# Patient Record
Sex: MALE | Race: BLACK OR AFRICAN AMERICAN | NOT HISPANIC OR LATINO
[De-identification: names, ages, dates, MRNs, and addresses within clinical notes are randomized per-mention and may not be internally consistent; named-entity substitution may affect disease eponyms.]

---

## 2017-10-20 PROBLEM — Z00.00 ENCOUNTER FOR PREVENTIVE HEALTH EXAMINATION: Status: ACTIVE | Noted: 2017-10-20

## 2017-10-22 ENCOUNTER — FORM ENCOUNTER (OUTPATIENT)
Age: 42
End: 2017-10-22

## 2017-10-23 ENCOUNTER — APPOINTMENT (OUTPATIENT)
Dept: ORTHOPEDIC SURGERY | Facility: CLINIC | Age: 42
End: 2017-10-23
Payer: COMMERCIAL

## 2017-10-23 ENCOUNTER — OUTPATIENT (OUTPATIENT)
Dept: OUTPATIENT SERVICES | Facility: HOSPITAL | Age: 42
LOS: 1 days | End: 2017-10-23
Payer: COMMERCIAL

## 2017-10-23 ENCOUNTER — APPOINTMENT (OUTPATIENT)
Dept: RADIOLOGY | Facility: CLINIC | Age: 42
End: 2017-10-23
Payer: COMMERCIAL

## 2017-10-23 VITALS — BODY MASS INDEX: 22.73 KG/M2 | HEIGHT: 68 IN | WEIGHT: 150 LBS

## 2017-10-23 DIAGNOSIS — Z83.3 FAMILY HISTORY OF DIABETES MELLITUS: ICD-10-CM

## 2017-10-23 DIAGNOSIS — Z87.891 PERSONAL HISTORY OF NICOTINE DEPENDENCE: ICD-10-CM

## 2017-10-23 DIAGNOSIS — Z82.49 FAMILY HISTORY OF ISCHEMIC HEART DISEASE AND OTHER DISEASES OF THE CIRCULATORY SYSTEM: ICD-10-CM

## 2017-10-23 DIAGNOSIS — Z78.9 OTHER SPECIFIED HEALTH STATUS: ICD-10-CM

## 2017-10-23 PROCEDURE — 73521 X-RAY EXAM HIPS BI 2 VIEWS: CPT | Mod: 26

## 2017-10-23 PROCEDURE — 99204 OFFICE O/P NEW MOD 45 MIN: CPT

## 2017-10-23 PROCEDURE — 73521 X-RAY EXAM HIPS BI 2 VIEWS: CPT

## 2017-11-15 ENCOUNTER — FORM ENCOUNTER (OUTPATIENT)
Age: 42
End: 2017-11-15

## 2017-11-16 ENCOUNTER — OUTPATIENT (OUTPATIENT)
Dept: OUTPATIENT SERVICES | Facility: HOSPITAL | Age: 42
LOS: 1 days | End: 2017-11-16

## 2017-11-16 ENCOUNTER — APPOINTMENT (OUTPATIENT)
Dept: ORTHOPEDIC SURGERY | Facility: CLINIC | Age: 42
End: 2017-11-16
Payer: COMMERCIAL

## 2017-11-16 ENCOUNTER — APPOINTMENT (OUTPATIENT)
Dept: MRI IMAGING | Facility: CLINIC | Age: 42
End: 2017-11-16
Payer: COMMERCIAL

## 2017-11-16 ENCOUNTER — APPOINTMENT (OUTPATIENT)
Dept: ULTRASOUND IMAGING | Facility: CLINIC | Age: 42
End: 2017-11-16
Payer: COMMERCIAL

## 2017-11-16 PROCEDURE — 27093 INJECTION FOR HIP X-RAY: CPT | Mod: LT

## 2017-11-16 PROCEDURE — 20611 DRAIN/INJ JOINT/BURSA W/US: CPT | Mod: LT,59

## 2017-11-16 PROCEDURE — 73722 MRI JOINT OF LWR EXTR W/DYE: CPT | Mod: 26,LT

## 2017-11-16 PROCEDURE — 99213 OFFICE O/P EST LOW 20 MIN: CPT | Mod: 25

## 2019-09-26 ENCOUNTER — APPOINTMENT (OUTPATIENT)
Dept: ORTHOPEDIC SURGERY | Facility: CLINIC | Age: 44
End: 2019-09-26
Payer: COMMERCIAL

## 2019-09-26 VITALS — WEIGHT: 150 LBS | HEIGHT: 68 IN | BODY MASS INDEX: 22.73 KG/M2

## 2019-09-26 DIAGNOSIS — M25.552 PAIN IN LEFT HIP: ICD-10-CM

## 2019-09-26 PROCEDURE — 99215 OFFICE O/P EST HI 40 MIN: CPT

## 2019-09-27 PROBLEM — M25.552 LEFT HIP PAIN: Noted: 2017-10-23

## 2019-10-06 ENCOUNTER — FORM ENCOUNTER (OUTPATIENT)
Age: 44
End: 2019-10-06

## 2019-10-07 ENCOUNTER — TRANSCRIPTION ENCOUNTER (OUTPATIENT)
Age: 44
End: 2019-10-07

## 2019-10-07 ENCOUNTER — APPOINTMENT (OUTPATIENT)
Dept: MRI IMAGING | Facility: CLINIC | Age: 44
End: 2019-10-07
Payer: COMMERCIAL

## 2019-10-07 ENCOUNTER — OUTPATIENT (OUTPATIENT)
Dept: OUTPATIENT SERVICES | Facility: HOSPITAL | Age: 44
LOS: 1 days | End: 2019-10-07

## 2019-10-07 PROCEDURE — 72148 MRI LUMBAR SPINE W/O DYE: CPT | Mod: 26

## 2019-10-10 ENCOUNTER — APPOINTMENT (OUTPATIENT)
Dept: ORTHOPEDIC SURGERY | Facility: CLINIC | Age: 44
End: 2019-10-10
Payer: COMMERCIAL

## 2019-10-10 ENCOUNTER — APPOINTMENT (OUTPATIENT)
Dept: PHYSICAL MEDICINE AND REHAB | Facility: CLINIC | Age: 44
End: 2019-10-10
Payer: COMMERCIAL

## 2019-10-10 VITALS
BODY MASS INDEX: 22.73 KG/M2 | RESPIRATION RATE: 16 BRPM | HEIGHT: 68 IN | OXYGEN SATURATION: 99 % | WEIGHT: 150 LBS | HEART RATE: 73 BPM

## 2019-10-10 PROCEDURE — 99244 OFF/OP CNSLTJ NEW/EST MOD 40: CPT

## 2019-10-10 PROCEDURE — 99214 OFFICE O/P EST MOD 30 MIN: CPT

## 2019-10-29 ENCOUNTER — APPOINTMENT (OUTPATIENT)
Dept: PHYSICAL MEDICINE AND REHAB | Facility: CLINIC | Age: 44
End: 2019-10-29
Payer: COMMERCIAL

## 2019-10-29 VITALS
WEIGHT: 150 LBS | HEART RATE: 68 BPM | DIASTOLIC BLOOD PRESSURE: 85 MMHG | BODY MASS INDEX: 22.73 KG/M2 | SYSTOLIC BLOOD PRESSURE: 139 MMHG | HEIGHT: 68 IN

## 2019-10-29 PROCEDURE — 95911 NRV CNDJ TEST 9-10 STUDIES: CPT

## 2019-10-29 PROCEDURE — 95886 MUSC TEST DONE W/N TEST COMP: CPT | Mod: 59

## 2019-10-31 ENCOUNTER — APPOINTMENT (OUTPATIENT)
Dept: PHYSICAL MEDICINE AND REHAB | Facility: CLINIC | Age: 44
End: 2019-10-31
Payer: COMMERCIAL

## 2019-10-31 VITALS
WEIGHT: 150 LBS | RESPIRATION RATE: 16 BRPM | OXYGEN SATURATION: 95 % | HEART RATE: 91 BPM | HEIGHT: 68 IN | BODY MASS INDEX: 22.73 KG/M2

## 2019-10-31 PROCEDURE — 99214 OFFICE O/P EST MOD 30 MIN: CPT

## 2019-11-15 ENCOUNTER — APPOINTMENT (OUTPATIENT)
Dept: MRI IMAGING | Facility: CLINIC | Age: 44
End: 2019-11-15
Payer: COMMERCIAL

## 2019-11-15 ENCOUNTER — OUTPATIENT (OUTPATIENT)
Dept: OUTPATIENT SERVICES | Facility: HOSPITAL | Age: 44
LOS: 1 days | End: 2019-11-15

## 2019-11-15 PROCEDURE — 70553 MRI BRAIN STEM W/O & W/DYE: CPT | Mod: 26

## 2019-12-05 ENCOUNTER — APPOINTMENT (OUTPATIENT)
Dept: NEUROLOGY | Facility: CLINIC | Age: 44
End: 2019-12-05
Payer: COMMERCIAL

## 2019-12-05 ENCOUNTER — APPOINTMENT (OUTPATIENT)
Dept: ORTHOPEDIC SURGERY | Facility: CLINIC | Age: 44
End: 2019-12-05
Payer: COMMERCIAL

## 2019-12-05 VITALS
BODY MASS INDEX: 21.37 KG/M2 | WEIGHT: 141 LBS | OXYGEN SATURATION: 98 % | DIASTOLIC BLOOD PRESSURE: 88 MMHG | SYSTOLIC BLOOD PRESSURE: 134 MMHG | TEMPERATURE: 98.2 F | HEART RATE: 64 BPM | HEIGHT: 68 IN

## 2019-12-05 PROCEDURE — 99245 OFF/OP CONSLTJ NEW/EST HI 55: CPT

## 2019-12-05 PROCEDURE — 99213 OFFICE O/P EST LOW 20 MIN: CPT

## 2019-12-05 NOTE — PHYSICAL EXAM
[FreeTextEntry1] : Physical Exam\par Constitutional: no apparent distress\par Psychiatric: normal affect, euthyhmic, alert and oriented x 3\par HEENT: moist mucous membranes, oropharynx clear\par Neck: supple, no lymphadenopathy\par Respiratory: clear to auscultation bilaterally, no crackles or wheezing\par Cardiovascular: regular rate and rhythm, normal S1/S2, no murmurs, no edema\par GI: soft, non-tender, no distended, bowel sounds present; no hepatosplenomegaly on palpation\par Musculoskeletal: extremities warm, well-perfused, normal range of motion\par Skin: no rashes, bruises, or lesions\par \par Neurologic Exam:\par Mental Status: awake and alert, oriented to time, place, and person, MOCA 26/30 (lost points for abstraction and delayed recall)\par Cranial Nerves: I: deferred; II: pupils equal round and reactive, visual fields full to confrontation; III, IV, VI: extraocular movements full with no nystagmus; V: facial sensation intact and symmetric; VII: facial power symmetric; VIII: hearing intact to finger rub; IX/X: palate elevates symmetrically, no dysarthria; XI: shoulder shrug symmetric; XII: tongue protrudes midline\par Motor: normal bulk and tone, no orbiting or pronator drift, power 5/5 to confrontation throughout including shoulder abduction, elbow flexion and extension, wrist flexion and extension, hip flexion, knee flexion and extension, plantarflexion, and dorsiflexion, no abnormal movements\par Sensation: intact to light touch in distal upper and lower extremities bilaterally\par Coordination: finger-nose-finger intact bilaterally\par Reflexes: 2+ biceps, triceps, brachioradialis, patella\par Gait: narrow base, normal stance and stride, normal arm swing

## 2019-12-05 NOTE — DISCUSSION/SUMMARY
[FreeTextEntry1] : 44-year-old man with left thigh and face tingling, word-finding difficulty, and short-term memory impairment over the past 2 months in the setting of depression and anxiety.  \par \par I am unable to find a physiologic explanation for the tingling -- he has no deficits on exam and imaging and neurophysiologic studies have been normal.  Given these normal findings I am reassured that despite the lack of a definite diagnosis these symptoms are not dangerous.\par \par Differential diagnosis for the cognitive symptoms in a patient his age includes unrecognized seizures (and we will obtain routine and ambulatory EEG to exclude this possibility) or, more likely, somatic manifestations of his underlying mood symptoms.  He is open to this possibility and had begun to consider it own his own prior to this evaluation.  We discussed potential treatments for depression and anxiety including talk therapy and SSRI.  He is not interested in therapy but would like to start an SSRI.  \par \par # Cognitive complaints, likely 2/2 depression and anxiety\par -Start Lexapro 10 mg (potential side effects discussed including lethargy, weight gain, decreased sex drive, withdrawal upon discontinuation, patient aware that effects can take up to 6-8 weeks)\par -Not interested in talk therapy at this time\par -Routine/ambulatory EEG to rule out unrecognized seizures\par \par RTC 3 months

## 2019-12-05 NOTE — DATA REVIEWED
[de-identified] : EMG/NCS unremarkable [de-identified] : MRI brain unremarkable\par MRI lumbar spine L4/L5 mild foraminal narrowing\par

## 2019-12-05 NOTE — REVIEW OF SYSTEMS
[As Noted in HPI] : as noted in HPI [Negative] : Endocrine [FreeTextEntry1] : see intake sheet, reviewed with patient

## 2019-12-05 NOTE — HISTORY OF PRESENT ILLNESS
[FreeTextEntry1] : 44-year-old man with no significant past medical history referred by Dr. Duvall for evaluation of multiple neurologic symptoms over the past 2 months.\par \par He reports that about 8 weeks ago he developed numbness, tingling, and burning pain in his left thigh.  He initially attributed this to a known labral tear.  Was seen by Ortho and PMR and had an MRI of the lumbar spine (mild bilateral foraminal narrowing at L4/L5) and an EMG/NCV (normal).  About 2 weeks later he developed similar tingling in the left side of his face.  MRI brain w/wo contrast was normal.  Around the same time as the facial tingling, he also began to have word-finding difficulty and trouble with short term memory, along with what he describes as a loss of fine motor control.  \par \par He also notes that he has been more irritable and short-tempered lately.  He no longer enjoys spending time with friends as much as he used to and has trouble motivating himself to go out.  Concentration, energy, and appetite are reduced and he has frequent negative thoughts, but denies SI.  He says that he has been under a lot stress over the past year (losing his job, starting a new job, family member with major medical problems, wife losing her job).  He is starting to wonder if some of his physical and cognitive symptoms may be related to stress and anxiety.

## 2019-12-24 ENCOUNTER — OTHER (OUTPATIENT)
Age: 44
End: 2019-12-24

## 2019-12-26 ENCOUNTER — OTHER (OUTPATIENT)
Age: 44
End: 2019-12-26

## 2020-02-27 ENCOUNTER — APPOINTMENT (OUTPATIENT)
Dept: NEUROLOGY | Facility: CLINIC | Age: 45
End: 2020-02-27
Payer: COMMERCIAL

## 2020-02-27 VITALS
HEIGHT: 68 IN | SYSTOLIC BLOOD PRESSURE: 149 MMHG | TEMPERATURE: 98.6 F | OXYGEN SATURATION: 96 % | HEART RATE: 66 BPM | WEIGHT: 145 LBS | BODY MASS INDEX: 21.98 KG/M2 | DIASTOLIC BLOOD PRESSURE: 90 MMHG | RESPIRATION RATE: 16 BRPM

## 2020-02-27 PROCEDURE — 99214 OFFICE O/P EST MOD 30 MIN: CPT

## 2020-02-27 NOTE — HISTORY OF PRESENT ILLNESS
[FreeTextEntry1] : 44-year-old man who presents for follow up of anxiety and multiple neurologic symptoms.  \par \par At last visit he complained of numbness/tingling burning in the left thigh and left side of his face, as well as irritability, anhedonia, brain fog in the setting of work and family stressors.  Started Lexapro 10 mg daily for anxiety at that visit.\par \par He reports that he has felt much better since starting the Lexapro -- confusion, brain fog, irritability have resolved.  He has noticed some sexual side effects but says these are manageable.  Has also noticed a mild low amplitude high frequency tremor in the left > right hand.  He does report that he is still under stress at work and has been drinking more heavily (4-5 drinks a night) and smoking cigarettes and marijuana occasionally.  \par \par Has seen an ophthalmologist since last visit and was diagnosed with central serous retinopathy which he was told was due to stress.  On repeat visit this resolved after starting Lexapro.\par \par Left face symptoms have resolved.  Left thigh numbness/tightness/tingling has persisted but it bothers him less than it used to.\par \par

## 2020-02-27 NOTE — DATA REVIEWED
[de-identified] : MRI brain 11/15/2019 personally reviewed, normal\par MRI L spine 10/08/2019 personally reviewed, no major spinal stenosis or disk space narrowing\par  [de-identified] : EMG 10/29/2019 normal

## 2020-02-27 NOTE — PHYSICAL EXAM
[FreeTextEntry1] : Physical Exam\par Constitutional: no apparent distress\par Psychiatric: normal affect, euthyhmic, alert and oriented x 3\par \par Neurologic Exam:\par Mental Status: awake and alert, speech fluent and prosodic with no paraphasic errors\par Cranial Nerves: I: deferred; II: pupils equal round and reactive, visual fields full to confrontation III, IV, VI: extraocular movements full with no nystagmus; V: facial sensation intact and symmetric; VII: facial power symmetric; VIII: hearing intact to finger rub; IX/X: palate elevates symmetrically, no dysarthria; XI: shoulder shrug symmetric; XII: tongue protrudes midline\par Motor: normal bulk and tone, no orbiting or pronator drift, no abnormal movements\par Sensation: intact to light touch in distal upper and lower extremities bilaterally\par Coordination: finger-nose-finger intact bilaterally\par Reflexes: 2+ biceps, triceps, brachioradialis, patella\par Gait: narrow base, normal stance and stride, normal arm swing

## 2020-02-27 NOTE — DISCUSSION/SUMMARY
[FreeTextEntry1] : 1) Anxiety -- dramatically improved on Lexapro 10 mg daily.  Brain fog/confusion/face tingling were likely all related to this as they have also resolved.\par -Continue Lexapro 10 mg for now, patient would like to continue until the spring then try to taper off\par -No need for EEG \par \par 2) Left thigh numbness/tingling -- likely meralgia paresthetica.  EMG/NCV was normal but this diagnosis does not always show up on neurophys studies.\par -Offered trial of gabapentin, patient is not as bothered by symptoms as he was previously and wants to hold off on medications for now\par \par 3) Alcohol/tobacco/marijuana use -- patient expressed that he wants to cut down on alcohol use and stop smoking cigarettes/marijuana.  Counseled about recommendations for alcohol use (<14 drinks/week for men) and marijuana/smoking cessation.

## 2020-08-03 ENCOUNTER — RX RENEWAL (OUTPATIENT)
Age: 45
End: 2020-08-03

## 2020-09-10 ENCOUNTER — APPOINTMENT (OUTPATIENT)
Dept: NEUROLOGY | Facility: CLINIC | Age: 45
End: 2020-09-10
Payer: COMMERCIAL

## 2020-09-10 VITALS
DIASTOLIC BLOOD PRESSURE: 82 MMHG | OXYGEN SATURATION: 98 % | SYSTOLIC BLOOD PRESSURE: 125 MMHG | HEART RATE: 60 BPM | HEIGHT: 68 IN | TEMPERATURE: 98.4 F | WEIGHT: 152 LBS | BODY MASS INDEX: 23.04 KG/M2

## 2020-09-10 PROCEDURE — 99214 OFFICE O/P EST MOD 30 MIN: CPT

## 2020-09-10 RX ORDER — ESCITALOPRAM OXALATE 10 MG/1
10 TABLET ORAL DAILY
Qty: 30 | Refills: 3 | Status: DISCONTINUED | COMMUNITY
Start: 2019-12-05 | End: 2020-09-10

## 2020-09-10 NOTE — ASSESSMENT
[FreeTextEntry1] : 1) Face/leg tingling -- resolved.\par \par 2) Anxiety -- initially with improvement on Lexapro, now not sure if is helping but would like to taper off.  This is reasonable to try but as his symptoms are ongoing he may need to restart at some point, particularly as he is resistant to the idea of talk therapy.\par -Lexapro 5 mg x 4 weeks, 5 mg QOD x 4 weeks then stop\par \par 3) Alcohol/marijuana use -- patient is aware this is an issue but not sure how to stop or whether he wants to.  Thinks he should see a therapist to address it but resistant to the idea.  I encouraged him to pursue this.\par \par RTC 2-3 months

## 2020-09-10 NOTE — PHYSICAL EXAM
[FreeTextEntry1] : Physical Exam\par Constitutional: no apparent distress\par Psychiatric: normal affect, euthyhmic, alert and oriented x 3\par Musculoskeletal: extremities warm, well-perfused, normal range of motion\par Skin: no rashes, bruises, or lesions\par \par Neurologic Exam:\par Mental Status: awake and alert, speech fluent and prosodic with no paraphasic errors\par Cranial Nerves: pupils equal, face symmetric, no dysarthria, tongue midline\par Motor: normal bulk and tone, no abnormal movements\par Gait: narrow base, normal stance and stride, normal arm swing

## 2020-10-02 ENCOUNTER — RX RENEWAL (OUTPATIENT)
Age: 45
End: 2020-10-02

## 2020-11-05 ENCOUNTER — APPOINTMENT (OUTPATIENT)
Dept: NEUROLOGY | Facility: CLINIC | Age: 45
End: 2020-11-05
Payer: COMMERCIAL

## 2020-11-05 PROCEDURE — 99214 OFFICE O/P EST MOD 30 MIN: CPT | Mod: 95

## 2020-11-05 RX ORDER — ESCITALOPRAM OXALATE 5 MG/1
5 TABLET ORAL
Qty: 30 | Refills: 1 | Status: DISCONTINUED | COMMUNITY
Start: 2020-09-10 | End: 2020-11-05

## 2020-11-05 NOTE — HISTORY OF PRESENT ILLNESS
[FreeTextEntry1] : Verbal consent given on 11/05/2020 at 14:24 by CARLOS MANUEL BRYAN, [].\par \par Feeling well since last visit.  Has been taking Lexapro 5 mg every other day for the past month.  Has not noticed any worsening of his mood.  He has noticed an intermittent return of tingling in his left leg.  Also reports that he has had full body muscle tightness/soreness, especially after working out.

## 2020-11-05 NOTE — ASSESSMENT
[FreeTextEntry1] : 1) Anxiety/mood symptoms: improved despite reduction in Lexapro dose -- will stop Lexapro and see how he does.  \par \par 2) Leg tingling: initially resolved, now recurrent.  Suspect this may be anxiety related as his EMG/NCS was normal.  No additional work up needed at this time.\par \par RTC PRN

## 2020-11-06 ENCOUNTER — OUTPATIENT (OUTPATIENT)
Dept: OUTPATIENT SERVICES | Facility: HOSPITAL | Age: 45
LOS: 1 days | End: 2020-11-06

## 2020-11-06 ENCOUNTER — APPOINTMENT (OUTPATIENT)
Dept: ULTRASOUND IMAGING | Facility: CLINIC | Age: 45
End: 2020-11-06
Payer: COMMERCIAL

## 2020-11-06 PROCEDURE — 76700 US EXAM ABDOM COMPLETE: CPT | Mod: 26

## 2021-02-16 ENCOUNTER — NON-APPOINTMENT (OUTPATIENT)
Age: 46
End: 2021-02-16

## 2021-02-16 DIAGNOSIS — R51 HEADACHE: ICD-10-CM

## 2021-02-16 DIAGNOSIS — S73.192A OTHER SPRAIN OF LEFT HIP, INITIAL ENCOUNTER: ICD-10-CM

## 2021-02-16 DIAGNOSIS — G51.4 FACIAL MYOKYMIA: ICD-10-CM

## 2021-02-16 DIAGNOSIS — M54.16 RADICULOPATHY, LUMBAR REGION: ICD-10-CM

## 2021-02-16 DIAGNOSIS — H53.9 UNSPECIFIED VISUAL DISTURBANCE: ICD-10-CM

## 2021-02-16 DIAGNOSIS — R47.9 UNSPECIFIED SPEECH DISTURBANCES: ICD-10-CM

## 2021-02-16 DIAGNOSIS — G57.11 MERALGIA PARESTHETICA, RIGHT LOWER LIMB: ICD-10-CM

## 2021-02-16 DIAGNOSIS — F41.9 ANXIETY DISORDER, UNSPECIFIED: ICD-10-CM

## 2021-02-16 DIAGNOSIS — F32.9 ANXIETY DISORDER, UNSPECIFIED: ICD-10-CM

## 2021-02-16 DIAGNOSIS — M25.852 OTHER SPECIFIED JOINT DISORDERS, LEFT HIP: ICD-10-CM

## 2021-02-16 NOTE — ASSESSMENT
[FreeTextEntry1] : No neurologic issues at this time.\par \par Previously seen for face and leg tingling, word-finding difficulty, and memory loss, all of which occurred during a brief period of stress/anxiety and resolved with a short course of Lexapro.  He is now off Lexapro and remains symptom free.  There is no need for further neurologic or psychiatric work up or treatment at this time.  He has been doing well off Lexapro for 4 months with no recurrence of symptoms, and had no mood symptoms prior to this brief period, and thus I do not suspect that he will need to resume this medication in the future.  He will let me know if he develops any new symptoms or questions.\par \par 15 minutes spent on this telephone encounter.

## 2021-02-16 NOTE — HISTORY OF PRESENT ILLNESS
[Home] : at home, [unfilled] , at the time of the visit. [Medical Office: ([facility])___] : at the medical office located in  [Verbal consent obtained from patient] : the patient, [unfilled] [FreeTextEntry1] : Has been feeling very well since last visit.  Has been off of Lexapro for 4 months with no recurrence of anxiety symptoms, face or leg tingling, word-finding difficulty, or memory loss.  He has no current neurologic or mood complaints.  He believes (and I agree) that all of these symptoms were provoked by stress/anxiety that he was experiencing over the past year that has now resolved.

## 2021-02-22 ENCOUNTER — APPOINTMENT (OUTPATIENT)
Dept: CT IMAGING | Facility: CLINIC | Age: 46
End: 2021-02-22
Payer: SELF-PAY

## 2021-02-22 ENCOUNTER — OUTPATIENT (OUTPATIENT)
Dept: OUTPATIENT SERVICES | Facility: HOSPITAL | Age: 46
LOS: 1 days | End: 2021-02-22

## 2021-02-22 ENCOUNTER — RESULT REVIEW (OUTPATIENT)
Age: 46
End: 2021-02-22

## 2021-02-22 PROCEDURE — 75571 CT HRT W/O DYE W/CA TEST: CPT | Mod: 26

## 2021-04-08 ENCOUNTER — APPOINTMENT (OUTPATIENT)
Dept: NEUROLOGY | Facility: CLINIC | Age: 46
End: 2021-04-08

## 2021-04-08 VITALS
DIASTOLIC BLOOD PRESSURE: 91 MMHG | HEART RATE: 55 BPM | TEMPERATURE: 98.5 F | HEIGHT: 68 IN | OXYGEN SATURATION: 98 % | SYSTOLIC BLOOD PRESSURE: 148 MMHG | BODY MASS INDEX: 23.19 KG/M2 | WEIGHT: 153 LBS

## 2022-02-15 ENCOUNTER — FORM ENCOUNTER (OUTPATIENT)
Age: 47
End: 2022-02-15

## 2022-09-22 ENCOUNTER — APPOINTMENT (OUTPATIENT)
Dept: ORTHOPEDIC SURGERY | Facility: CLINIC | Age: 47
End: 2022-09-22

## 2022-09-22 VITALS — BODY MASS INDEX: 23.19 KG/M2 | WEIGHT: 153 LBS | HEIGHT: 68 IN

## 2022-09-22 DIAGNOSIS — M25.511 PAIN IN RIGHT SHOULDER: ICD-10-CM

## 2022-09-22 PROCEDURE — 73030 X-RAY EXAM OF SHOULDER: CPT | Mod: RT

## 2022-09-22 PROCEDURE — 99213 OFFICE O/P EST LOW 20 MIN: CPT

## 2023-07-10 ENCOUNTER — APPOINTMENT (OUTPATIENT)
Dept: NEUROLOGY | Facility: CLINIC | Age: 48
End: 2023-07-10

## 2023-10-30 ENCOUNTER — APPOINTMENT (OUTPATIENT)
Dept: NEUROLOGY | Facility: CLINIC | Age: 48
End: 2023-10-30

## 2024-01-03 ENCOUNTER — APPOINTMENT (OUTPATIENT)
Dept: NEUROLOGY | Facility: CLINIC | Age: 49
End: 2024-01-03

## 2024-01-10 ENCOUNTER — OUTPATIENT (OUTPATIENT)
Dept: OUTPATIENT SERVICES | Facility: HOSPITAL | Age: 49
LOS: 1 days | End: 2024-01-10

## 2024-01-10 ENCOUNTER — APPOINTMENT (OUTPATIENT)
Dept: ULTRASOUND IMAGING | Facility: CLINIC | Age: 49
End: 2024-01-10
Payer: COMMERCIAL

## 2024-01-10 PROCEDURE — 76870 US EXAM SCROTUM: CPT | Mod: 26

## 2024-01-10 PROCEDURE — 93975 VASCULAR STUDY: CPT | Mod: 26

## 2024-01-24 ENCOUNTER — NON-APPOINTMENT (OUTPATIENT)
Age: 49
End: 2024-01-24

## 2024-01-25 ENCOUNTER — NON-APPOINTMENT (OUTPATIENT)
Age: 49
End: 2024-01-25

## 2024-01-25 ENCOUNTER — APPOINTMENT (OUTPATIENT)
Dept: UROLOGY | Facility: CLINIC | Age: 49
End: 2024-01-25
Payer: COMMERCIAL

## 2024-01-25 VITALS
SYSTOLIC BLOOD PRESSURE: 143 MMHG | TEMPERATURE: 97.5 F | HEIGHT: 68 IN | DIASTOLIC BLOOD PRESSURE: 90 MMHG | HEART RATE: 76 BPM | WEIGHT: 153 LBS | BODY MASS INDEX: 23.19 KG/M2

## 2024-01-25 PROCEDURE — 99204 OFFICE O/P NEW MOD 45 MIN: CPT

## 2024-01-25 NOTE — ASSESSMENT
[FreeTextEntry1] : 48 year M with PMHx of HLD presenting for pelvic pain epididymal cyst unlikely source of pain scrotal sono demonstrates epdidyaml cyst Pelvic pain - NSAIDs -Sitz Baths

## 2024-01-25 NOTE — HISTORY OF PRESENT ILLNESS
[FreeTextEntry1] : CARLOS MANUEL BRYAN is a 48 year M with PMHx of HLD presenting for pelvic pain on 01/25/2024   He reports - has had intermittent pelvic pain for many years but has become more protracted since November  - reports slightly more prominent on L side - also reports Labrum tear, very active with  - with PCP [facility] negative US findings 1/2024 - negative urine testing 12/2023  - sexually active, monogamous  He denies fever chills, flank pain,    Social history; social drinking, former social smoker   Family history: maternal grandfather throat cancer.    Allergies: Sulfa

## 2024-01-25 NOTE — END OF VISIT
[FreeTextEntry3] : I, Dr. Dixon, personally performed the evaluation and management (E/M) services for this new patient.  That E/M includes conducting the clinically appropriate initial history &/or exam, assessing all conditions, and establishing the plan of care.  Today, my CHANDRAKANT, Sabino Estrella, was here to observe my evaluation and management service for this patient & follow plan of care established by me going forward.

## 2025-02-19 ENCOUNTER — APPOINTMENT (OUTPATIENT)
Dept: ORTHOPEDIC SURGERY | Facility: CLINIC | Age: 50
End: 2025-02-19
Payer: COMMERCIAL

## 2025-02-19 VITALS — WEIGHT: 150 LBS | BODY MASS INDEX: 22.73 KG/M2 | HEIGHT: 68 IN | RESPIRATION RATE: 18 BRPM

## 2025-02-19 DIAGNOSIS — S33.4XXA TRAUMATIC RUPTURE OF SYMPHYSIS PUBIS, INITIAL ENCOUNTER: ICD-10-CM

## 2025-02-19 PROCEDURE — 72190 X-RAY EXAM OF PELVIS: CPT

## 2025-02-19 PROCEDURE — 99203 OFFICE O/P NEW LOW 30 MIN: CPT

## 2025-02-20 ENCOUNTER — APPOINTMENT (OUTPATIENT)
Dept: PHYSICAL MEDICINE AND REHAB | Facility: CLINIC | Age: 50
End: 2025-02-20

## 2025-04-09 ENCOUNTER — APPOINTMENT (OUTPATIENT)
Dept: ORTHOPEDIC SURGERY | Facility: CLINIC | Age: 50
End: 2025-04-09
Payer: COMMERCIAL

## 2025-04-09 VITALS — RESPIRATION RATE: 18 BRPM | WEIGHT: 150 LBS | HEIGHT: 68 IN | BODY MASS INDEX: 22.73 KG/M2

## 2025-04-09 DIAGNOSIS — S76.212A STRAIN OF ADDUCTOR MUSCLE, FASCIA AND TENDON OF LEFT THIGH, INITIAL ENCOUNTER: ICD-10-CM

## 2025-04-09 DIAGNOSIS — M76.12 PSOAS TENDINITIS, LEFT HIP: ICD-10-CM

## 2025-04-09 DIAGNOSIS — S33.4XXA TRAUMATIC RUPTURE OF SYMPHYSIS PUBIS, INITIAL ENCOUNTER: ICD-10-CM

## 2025-04-09 PROCEDURE — 99213 OFFICE O/P EST LOW 20 MIN: CPT

## 2025-04-21 NOTE — HISTORY OF PRESENT ILLNESS
[FreeTextEntry1] : Left leg and face tingling have resolved.\par \par He has continued taking Lexapro but is not sure whether it is helping or not.  He reports that he no longer feels sad but he does feel stressed/anxious due to work, family, and financial issues.  He is still drinking heavily (~6 drinks/night) and thinks that this is too much but is not sure how to cut down.  Also frequently smokes marijuana.  No other drugs.
No indicators present

## 2025-09-17 ENCOUNTER — APPOINTMENT (OUTPATIENT)
Dept: ORTHOPEDIC SURGERY | Facility: CLINIC | Age: 50
End: 2025-09-17
Payer: COMMERCIAL

## 2025-09-17 DIAGNOSIS — R10.2 PELVIC AND PERINEAL PAIN: ICD-10-CM

## 2025-09-17 DIAGNOSIS — G89.29 PELVIC AND PERINEAL PAIN: ICD-10-CM

## 2025-09-17 PROCEDURE — 99214 OFFICE O/P EST MOD 30 MIN: CPT
